# Patient Record
Sex: MALE | Race: WHITE
[De-identification: names, ages, dates, MRNs, and addresses within clinical notes are randomized per-mention and may not be internally consistent; named-entity substitution may affect disease eponyms.]

---
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
## 2023-01-12 ENCOUNTER — HOSPITAL ENCOUNTER (INPATIENT)
Dept: HOSPITAL 47 - 3MHU | Age: 23
LOS: 5 days | Discharge: HOME | DRG: 897 | End: 2023-01-17
Attending: PSYCHIATRY & NEUROLOGY | Admitting: PSYCHIATRY & NEUROLOGY
Payer: COMMERCIAL

## 2023-01-12 DIAGNOSIS — F19.14: Primary | ICD-10-CM

## 2023-01-12 DIAGNOSIS — Z79.899: ICD-10-CM

## 2023-01-12 DIAGNOSIS — Z63.4: ICD-10-CM

## 2023-01-12 DIAGNOSIS — F12.10: ICD-10-CM

## 2023-01-12 DIAGNOSIS — Z91.52: ICD-10-CM

## 2023-01-12 DIAGNOSIS — F41.9: ICD-10-CM

## 2023-01-12 DIAGNOSIS — Z71.41: ICD-10-CM

## 2023-01-12 DIAGNOSIS — Z65.3: ICD-10-CM

## 2023-01-12 DIAGNOSIS — F17.210: ICD-10-CM

## 2023-01-12 DIAGNOSIS — Z71.51: ICD-10-CM

## 2023-01-12 DIAGNOSIS — F10.10: ICD-10-CM

## 2023-01-12 DIAGNOSIS — Z71.6: ICD-10-CM

## 2023-01-12 DIAGNOSIS — F84.0: ICD-10-CM

## 2023-01-12 DIAGNOSIS — F90.9: ICD-10-CM

## 2023-01-12 DIAGNOSIS — Z56.0: ICD-10-CM

## 2023-01-12 PROCEDURE — 80061 LIPID PANEL: CPT

## 2023-01-12 PROCEDURE — 84443 ASSAY THYROID STIM HORMONE: CPT

## 2023-01-12 PROCEDURE — 85025 COMPLETE CBC W/AUTO DIFF WBC: CPT

## 2023-01-12 PROCEDURE — 80053 COMPREHEN METABOLIC PANEL: CPT

## 2023-01-12 SDOH — ECONOMIC STABILITY - INCOME SECURITY: UNEMPLOYMENT, UNSPECIFIED: Z56.0

## 2023-01-12 SDOH — SOCIAL STABILITY - SOCIAL INSECURITY: DISSAPEARANCE AND DEATH OF FAMILY MEMBER: Z63.4

## 2023-01-12 NOTE — P.MDCNMH
History of Present Illness


H&P Date: 01/12/23


Chief Complaint: Medical clearance





22-year-old man with no medical history, with psychiatric history of psychosis, 

presented with psychosis.  Psychiatry is following patient, consulted medicine 

for medical clearance.  Patient has no medical complaints.  Patient denies any 

history of hypertension, diabetes, hyperlipidemia, heart disease, kidney 

disease, liver disease, strokes, family history of heart disease.  Patient also 

denies fevers, chills, nausea, vomiting, chest pain, palpitations, syncope, 

presyncope, cough, dyspnea, abdominal pain, constipation, diarrhea, dysuria, 

skin, numbness/weakness of extremities.





, Evaluation, patient is afebrile, hemodynamically stable.  No labs to review.  

No images to review.





All Systems reviewed and pertinent positives and negatives noted in HPI, all 

other symptoms are negative





Gen: in no apparent distress, resting comfortably in bed


Eyes: PERRL, no scleral injection or icterus


HENT: normocephalic, atraumatic, good hearing acuity, moist mucous membranes


Neck: no tracheal deviation, full range of motion


Resp: good air exchange, breathing comfortably with no accessory muscle use, no 

tactile fremitus


CVS: good distal perfusion x 4, no pitting edema


GI: soft, NTTP, ND, no hepatosplenomegaly


: no suprapubic tenderness, no CVAT, thomas catheter not present


MSK: no clubbing, no cyanosis, no noted contractures of extremities


Skin: no noted rashes, petechiae; temperature of skin is appropriate


Neuro: moving all extremities without signs of weakness, CN II-XII intact


Psych: cooperative, euthymic mood, insight and judgment intact





Assessment/plan:





Nicotine abuse


-Recommended cessation


-Nicotine lozenges available upon request








Psychosis


-Care per primary team





Thank you for this consult.  A member El[facility] is available 24/7, should any 

questions or concerns arise please whichever perfect serve








Past Medical History


Past Medical History: No Reported History


Additional Past Medical History / Comment(s): Hospitalized for influenza-gets 

anxiety.  Depression- In counseling and on medication.  History of self injury 

behavior


History of Any Multi-Drug Resistant Organisms: None Reported


Past Surgical History: Orthopedic Surgery


Additional Past Surgical History / Comment(s): rt femur fracture with dimas 

placement and removal


Past Anesthesia/Blood Transfusion Reactions: No Reported Reaction


Past Psychological History: Anxiety, Bipolar, Depression


Additional Psychological History / Comment(s): hx cutting 3 months ago. Sees Dr Hickman once every couple of months, monitors medications, [facility] in [place]. 

Gets Anxiety in the hospital, likes to be home.


Smoking Status: Current every day smoker


Past Alcohol Use History: None Reported


Past Drug Use History: None Reported





- Past Family History


  ** Mother


Additional Family Medical History / Comment(s): multiple sclerosis





  ** Father


History Unknown: Yes





  ** Brother(s)


Family Medical History: No Reported History





Medications and Allergies


                                Home Medications











 Medication  Instructions  Recorded  Confirmed  Type


 


ARIPiprazole [Abilify] 20 mg PO HS 11/19/19 11/22/19 History


 


Escitalopram [Lexapro] 20 mg PO DAILY 11/19/19 11/22/19 History


 


Lisdexamfetamine Dimesylate 60 mg PO DAILY 11/19/19 11/22/19 History





[Vyvanse]    


 


Acetaminophen Tab [Tylenol Tab] 500 mg PO Q6H PRN #30 tablet 11/22/19  Rx


 


Ibuprofen [Motrin] 600 mg PO Q8HR PRN #30 tab 11/22/19  Rx


 


Omeprazole 40 mg PO DAILY #14 capsule. 11/22/19  Rx








                                    Allergies











Allergy/AdvReac Type Severity Reaction Status Date / Time


 


No Known Allergies Allergy   Verified 11/22/19 14:32














Physical Exam


Osteopathic Statement: *.  No significant issues noted on an osteopathic 

structural exam other than those noted in the History and Physical/Consult.


Vitals: 


                                   Vital Signs











  Temp Pulse Resp BP Pulse Ox


 


 01/12/23 11:29  97.6 F  108 H  16  127/75  97








                                Intake and Output











 01/11/23 01/12/23 01/12/23





 22:59 06:59 14:59


 


Other:   


 


  Weight   90.718 kg














Cranial Nerve Examination





- Cranial Nerves


Cranial Nerve II- Optic: Intact


Cranial Nerve III- Oculomotor: Intact


Cranial Nerve IV- Trochlear: Intact


Cranial Nerve V- Trigeminal: Intact


Cranial Nerve VI- Abducens: Intact


Cranial Nerve VII- Facial: Intact


Cranial Nerve VIII- Auditory: Intact


Cranial Nerve IX- Glossopharyngeal: Intact


Cranial Nerve X- Vagus: Intact


Cranial Nerve XI- Accessory: Intact


Cranial Nerve XII- Hypoglossal: Intact

## 2023-01-13 LAB
ALBUMIN SERPL-MCNC: 4.8 G/DL (ref 3.5–5)
ALP SERPL-CCNC: 51 U/L (ref 38–126)
ALT SERPL-CCNC: 16 U/L (ref 4–49)
ANION GAP SERPL CALC-SCNC: 6 MMOL/L
AST SERPL-CCNC: 23 U/L (ref 17–59)
BASOPHILS # BLD AUTO: 0.1 K/UL (ref 0–0.2)
BASOPHILS NFR BLD AUTO: 1 %
BUN SERPL-SCNC: 19 MG/DL (ref 9–20)
CALCIUM SPEC-MCNC: 10 MG/DL (ref 8.4–10.2)
CHLORIDE SERPL-SCNC: 105 MMOL/L (ref 98–107)
CHOLEST SERPL-MCNC: 132 MG/DL (ref 0–200)
CO2 SERPL-SCNC: 31 MMOL/L (ref 22–30)
EOSINOPHIL # BLD AUTO: 0.4 K/UL (ref 0–0.7)
EOSINOPHIL NFR BLD AUTO: 5 %
ERYTHROCYTE [DISTWIDTH] IN BLOOD BY AUTOMATED COUNT: 5.17 M/UL (ref 4.3–5.9)
ERYTHROCYTE [DISTWIDTH] IN BLOOD: 12.6 % (ref 11.5–15.5)
GLUCOSE SERPL-MCNC: 85 MG/DL (ref 74–99)
HCT VFR BLD AUTO: 46.7 % (ref 39–53)
HDLC SERPL-MCNC: 58.2 MG/DL (ref 40–60)
HGB BLD-MCNC: 16.1 GM/DL (ref 13–17.5)
LDLC SERPL CALC-MCNC: 59.6 MG/DL (ref 0–131)
LYMPHOCYTES # SPEC AUTO: 1.8 K/UL (ref 1–4.8)
LYMPHOCYTES NFR SPEC AUTO: 23 %
MCH RBC QN AUTO: 31.2 PG (ref 25–35)
MCHC RBC AUTO-ENTMCNC: 34.5 G/DL (ref 31–37)
MCV RBC AUTO: 90.4 FL (ref 80–100)
MONOCYTES # BLD AUTO: 0.5 K/UL (ref 0–1)
MONOCYTES NFR BLD AUTO: 6 %
NEUTROPHILS # BLD AUTO: 4.9 K/UL (ref 1.3–7.7)
NEUTROPHILS NFR BLD AUTO: 64 %
PLATELET # BLD AUTO: 237 K/UL (ref 150–450)
POTASSIUM SERPL-SCNC: 4.7 MMOL/L (ref 3.5–5.1)
PROT SERPL-MCNC: 7.8 G/DL (ref 6.3–8.2)
SODIUM SERPL-SCNC: 142 MMOL/L (ref 137–145)
TRIGL SERPL-MCNC: 71.2 MG/DL (ref 0–149)
VLDLC SERPL CALC-MCNC: 14.24 MG/DL (ref 5–40)
WBC # BLD AUTO: 7.7 K/UL (ref 3.8–10.6)

## 2023-01-13 RX ADMIN — ASPIRIN SCH: 325 TABLET ORAL at 09:53

## 2023-01-13 RX ADMIN — ESCITALOPRAM OXALATE SCH MG: 20 TABLET, FILM COATED ORAL at 09:52

## 2023-01-13 RX ADMIN — NICOTINE SCH PATCH: 21 PATCH, EXTENDED RELEASE TRANSDERMAL at 09:53

## 2023-01-13 NOTE — P.HP
Psychiatric H&P





- .


H&P Date: 01/13/23


History & Physical: 


                                    Allergies











Allergy/AdvReac Type Severity Reaction Status Date / Time


 


No Known Allergies Allergy   Verified 11/22/19 14:32








                                   Vital Signs











Temp  97.6 F   01/12/23 11:29


 


Pulse  108 H  01/12/23 11:29


 


Resp  16   01/12/23 11:29


 


BP  127/75   01/12/23 11:29


 


Pulse Ox  97   01/12/23 11:29


 


FiO2      








                                 Intake & Output











 01/12/23 01/13/23 01/13/23





 18:59 06:59 18:59


 


Weight 90.718 kg  








                             Laboratory Last Values











WBC  7.7 k/uL (3.8-10.6)   01/13/23  08:00    


 


RBC  5.17 m/uL (4.30-5.90)   01/13/23  08:00    


 


Hgb  16.1 gm/dL (13.0-17.5)   01/13/23  08:00    


 


Hct  46.7 % (39.0-53.0)   01/13/23  08:00    


 


MCV  90.4 fL (80.0-100.0)   01/13/23  08:00    


 


MCH  31.2 pg (25.0-35.0)   01/13/23  08:00    


 


MCHC  34.5 g/dL (31.0-37.0)   01/13/23  08:00    


 


RDW  12.6 % (11.5-15.5)   01/13/23  08:00    


 


Plt Count  237 k/uL (150-450)   01/13/23  08:00    


 


MPV  7.3   01/13/23  08:00    


 


Neutrophils %  64 %  01/13/23  08:00    


 


Lymphocytes %  23 %  01/13/23  08:00    


 


Monocytes %  6 %  01/13/23  08:00    


 


Eosinophils %  5 %  01/13/23  08:00    


 


Basophils %  1 %  01/13/23  08:00    


 


Neutrophils #  4.9 k/uL (1.3-7.7)   01/13/23  08:00    


 


Lymphocytes #  1.8 k/uL (1.0-4.8)   01/13/23  08:00    


 


Monocytes #  0.5 k/uL (0-1.0)   01/13/23  08:00    


 


Eosinophils #  0.4 k/uL (0-0.7)   01/13/23  08:00    


 


Basophils #  0.1 k/uL (0-0.2)   01/13/23  08:00    


 


Sodium  142 mmol/L (137-145)   01/13/23  07:49    


 


Potassium  4.7 mmol/L (3.5-5.1)   01/13/23  07:49    


 


Chloride  105 mmol/L ()   01/13/23  07:49    


 


Carbon Dioxide  31 mmol/L (22-30)  H  01/13/23  07:49    


 


Anion Gap  6 mmol/L  01/13/23  07:49    


 


BUN  19 mg/dL (9-20)   01/13/23  07:49    


 


Creatinine  0.80 mg/dL (0.66-1.25)   01/13/23  07:49    


 


Est GFR (CKD-EPI)AfAm  >90  (>60 ml/min/1.73 sqM)   01/13/23  07:49    


 


Est GFR (CKD-EPI)NonAf  >90  (>60 ml/min/1.73 sqM)   01/13/23  07:49    


 


Glucose  85 mg/dL (74-99)   01/13/23  07:49    


 


Calcium  10.0 mg/dL (8.4-10.2)   01/13/23  07:49    


 


Total Bilirubin  0.8 mg/dL (0.2-1.3)   01/13/23  07:49    


 


AST  23 U/L (17-59)   01/13/23  07:49    


 


ALT  16 U/L (4-49)   01/13/23  07:49    


 


Alkaline Phosphatase  51 U/L ()   01/13/23  07:49    


 


Total Protein  7.8 g/dL (6.3-8.2)   01/13/23  07:49    


 


Albumin  4.8 g/dL (3.5-5.0)   01/13/23  07:49    


 


TSH  0.474 mIU/L (0.465-4.680)   01/13/23  07:49    











01/13/23 13:27


IDENTIFYING DATA: Patient is a 22-year-old single, unemployed, on disability, 

 male with significant history of autism spectrum disorder who 

presented to our hospital transferred from [facility] for psychiatric 

evaluation.





HPI: Patient presented to the hospital on 01/12/2023, transferred from [facility] for psychiatric evaluation.  As per APS report, the patient's father recently

passed away from cancer and has been increasingly depressed.  He has also been 

abusing his Vyvanse.  He was subsequently admitted to the psychiatric unit.


Upon evaluation on the psychiatric unit, the patient reports that he has been 

using approximately 120-240 mg of Vyvanse daily.  He reports that he was using 

this for the enjoyment in order to "feel better."  He states that over the past 

few days, he began to experience significant paranoia.  He reports that he was 

feeling like he was detached from the world and that people around him were 

often talking about him or working against him.  Furthermore, the patient 

reports that he has not been able to sleep over the past few days.  He reports 

that he has had little to no appetite.  He however denies any auditory or visual

hallucinations.  He does acknowledge racing thoughts and excessive worry.  The 

patient does report that his father passed away in September.  He reports that 

he has been unable to cope.  He reports that he was drinking all the time in 

order to cope.  He does endorse significant symptoms of depression including 

anhedonia, and crying episodes.  The patient vehemently denies any suicidal or 

homicidal ideation, intention, and/or plan.  


The patient is currently on a regimen of Abilify and Lexapro and Vyvanse.  He 

reports that this medication regimen has been helpful however he has been 

abusing the Vyvanse.





PAST PSYCHIATRIC HISTORY: Patient states that he has been to see diagnosed with 

autism spectrum disorder, depression, and anxiety.  He is currently on a home 

regimen of Vyvanse, Lexapro, and Abilify.  Patient denies any previous 

psychiatric hospitalizations.  The patient is currently open with [facility] psychiatric care with Dr. Stewart. Patient denies any history of suicide 

attempts in the past.





PMH:


Past Medical History: No Reported History


Additional Past Medical History / Comment(s): Hospitalized for influenza-gets 

anxiety.  Depression- In counseling and on medication.  History of self injury 

behavior


History of Any Multi-Drug Resistant Organisms: None Reported


Past Surgical History: Orthopedic Surgery


Additional Past Surgical History / Comment(s): rt femur fracture with dimas 

placement and removal


Past Anesthesia/Blood Transfusion Reactions: No Reported Reaction


Past Psychological History: Anxiety, Bipolar, Depression


Additional Psychological History / Comment(s): hx cutting 3 months ago. Sees Dr Hickman once every couple of months, monitors medications, [facility] in [place]. 

Gets Anxiety in the hospital, likes to be home.


Smoking Status: Current every day smoker


Past Alcohol Use History: None Reported


Past Drug Use History: None Reported





ALLERGIES: NO KNOWN DRUG ALLERGIES





CHEMICAL DEPENDENCY HISTORY: The patient reports smoking one pack per day of 

tobacco.  He reports that he was drinking heavily at first (approximately 15 

beers a day) however stopped drinking since June after being charged for 

depressive violence.  He also reports daily marijuana use.  He denies any 

illicit drug use and admits that he was abusing his Vyvanse.





FAMILY PSYCHIATRIC/SUBSTANCE USE HISTORY:  The patient denies any family history

of psychiatric illness or substance abuse history.





SOCIAL HISTORY: Patient was born in [place] and raised in [place], [place].  He

receives Social Security due to his autism spectrum disorder.  He reports he was

diagnosed when he was 6 years old.  Currently lives with his mother and brother.

 He states that he is a Jehovah's witness.  He reports that he is on probation for 

domestic violence.





MENTAL STATUS EXAM: 


General Appearance: Patient appears to be stated age is alert, directable, and 

attempts to cooperate. Patient appears to have slightly disheveled hygiene and 

grooming.


Behavior: Patient is seated without any agitated behavior.  Eye contact is 

appropriate.


Speech: Patient's speech is fluent and nonpressured.  Monotone.


Mood/Affect: Patient reports their mood is anxious but better, affect is 

congruent and flat.


Suicidality/Homicidality:  Patient denies having any homicidal ideation intent 

or plan. Denies any suicidal ideations intent or plan  


Perceptions: Patient denies any visual hallucinations and denies any auditory 

hallucinations


Though content/process: Patient endorses mild feelings of paranoia.


Memory and concentration: AOX3, grossly intact for the purposes of this session.

Can spell "WORLD" backwards


Judgment and insight: Mildly improving





STRENGTHS/WEAKNESSES: Strength is that the patient has good support.  Weakness 

is that the patient engages in substance abuse.





INTELLECT: Below average to average





IMPRESSIONS: 


Substance-induced bipolar disorder


Bereavement


Alcohol use disorder, in partial remission


Cannabis use disorder


Tobacco use disorder





PLAN: 


-Patient is admitted under voluntary status to [facility] for stabilization of 

psychiatric symptoms and safety. Patient signed adult voluntary form and 

medication consent and is placed in patient's chart. 


-Medications : 


We will discontinue Vyvanse.  We will continue the patient's home medications of

Lexapro 20 mg daily and Abilify 20 mg daily. Patient is currently on 

antipsychotic regimen with abilify. Consider CASTELLANO transition. Will provide 

patient tincture of time off vyvanse.


-Ativan and Haldol PRN for agitation/aggression


-Patient was counselled on substance abuse and desired to cut back on use


-Patient was informed of the risks, benefits and side effects of the medication 

and patient verbally consented to taking the medications. Patient signed med 

consent form and was placed in chart.


-Internal Medicine consult to perform medical evaluation and physical.


-NRT - nicotine patch


-SW on board for discharge planning. Encourage patient to participate in groups 

to work on coping skills. 


01/13/23 13:27

## 2023-01-14 RX ADMIN — ASPIRIN SCH: 325 TABLET ORAL at 09:34

## 2023-01-14 RX ADMIN — NICOTINE SCH PATCH: 21 PATCH, EXTENDED RELEASE TRANSDERMAL at 09:33

## 2023-01-14 RX ADMIN — AMITRIPTYLINE HYDROCHLORIDE SCH MG: 50 TABLET, FILM COATED ORAL at 16:26

## 2023-01-14 RX ADMIN — ESCITALOPRAM OXALATE SCH MG: 20 TABLET, FILM COATED ORAL at 09:34

## 2023-01-14 NOTE — P.PN
Subjective


Progress Note Date: 01/14/23


Principal diagnosis: 





Jan 14, 2023,


He was seen today face to face in his room He was alert and engaged with him by 

the bedside, No behavioral mannerism no speech problem. He spoke in a calm 

manner re: loss of his family as the trigger for his psychiatric compensation. 

When asked about the overdosse of Vyanese mixed with Valium he spoke i na matter

of act re: His selfmediation attempt to manage better his worsening mood. He did

not articulate the sense of loos . He talked about his early school but did not 

feel stigmatize of his diagnosis of ASD.  ADHD as part of the ASD sydnrome was 

th emost okikely for him to be started on Vyanase. Anxiety fsymptom was apparent

; hwoever, his impulsivity fas his ASHD might explain whey he overdosed on Rx. 

He was unaware of the link. He was reported to be participating in social groups

on the unit: no acting out behavior No vocied suicidal behavior.





RE: ASD with comorbid or integral to ASD (Autism sepctrum disorder) ADHD : 

impulsivity and atypical mood disorder related to prolonged bereavement . 


Fmaily dynsmics to be explored. Comorbid medical condition to be reviewed, 





[facility]< He was coherent and had good eye contact.wth no motor or dysphasis . He was

pleasant coperative and reported his mood was low around th etime of his ov

erdose. but he reassureed me his suicidal ideaiton has larrgely resolved. He did

not have any preoccupation with viiolence theme no homicidal or suicidal 

ideation. He did not endorse any perceptual disturbance. Thought process. no 

derailment or tangentiality. Cog. Average intelligence. socail cognition to be 

further reassessed, Insight and judgment has advanced to above average level 





Management: 


1. He c/o of Gi vomitiing after amitryptyline 100 mg was d/c.  Rule out IBS or 

Alternative Rx for his OCD of his ASD syndrome


2. he might benefit from non-atimulatnt Rx; guanafacine for his ADHD .  


3. Ability dosage can be firther titrated.  


4. Self Rx may predict substnace mis use syndrome. He may do through vyvanese 

withdrawal syndrome to be monitored closel


5. Adhere to discharge planning


. famiy support to be reinforced.





Objective





- Vital Signs


Vital signs: 


                                   Vital Signs











Temp  97.9 F   01/14/23 06:41


 


Pulse  68   01/14/23 06:41


 


Resp  18   01/14/23 06:41


 


BP  145/84   01/14/23 06:41


 


Pulse Ox  99   01/14/23 06:41


 


FiO2      














- Labs


CBC & Chem 7: 


                                 01/13/23 08:00





                                 01/13/23 07:49

## 2023-01-15 RX ADMIN — ASPIRIN SCH: 325 TABLET ORAL at 09:29

## 2023-01-15 RX ADMIN — ESCITALOPRAM OXALATE SCH MG: 20 TABLET, FILM COATED ORAL at 09:29

## 2023-01-15 RX ADMIN — NICOTINE SCH PATCH: 21 PATCH, EXTENDED RELEASE TRANSDERMAL at 09:28

## 2023-01-15 RX ADMIN — AMITRIPTYLINE HYDROCHLORIDE SCH MG: 50 TABLET, FILM COATED ORAL at 09:29

## 2023-01-15 NOTE — P.PN
Subjective


Progress Note Date: 01/15/23


Principal diagnosis: 





porgress note text


He was seen today in his room . He did not want to talk further about his ADHD 

symptoms no craving for Vyvances. hE contacted his parents in good terms. He 

lacked insight as to the indications for various Rx and di d not want to talk 

about how his ASD social cognition symptoms may be improved wiht non-stimulant 

Rx; Guanfacine , Clonidien ER.  He talked about his plan to be disschared early 

next week. Diagnosis: ASD improved. Comorbid depressive disorder good response 

after he was titrated from his Rx at admisison. Plan: Connect with his family . 

Early discrhage. Follow up by his psychaitric team.





Objective





- Vital Signs


Vital signs: 


                                   Vital Signs











Temp  97.9 F   01/14/23 06:41


 


Pulse  68   01/14/23 06:41


 


Resp  18   01/14/23 06:41


 


BP  145/84   01/14/23 06:41


 


Pulse Ox  99   01/14/23 06:41


 


FiO2      














- Labs


CBC & Chem 7: 


                                 01/13/23 08:00





                                 01/13/23 07:49

## 2023-01-16 RX ADMIN — ASPIRIN SCH: 325 TABLET ORAL at 10:30

## 2023-01-16 RX ADMIN — NICOTINE SCH PATCH: 21 PATCH, EXTENDED RELEASE TRANSDERMAL at 08:34

## 2023-01-16 RX ADMIN — ESCITALOPRAM OXALATE SCH MG: 20 TABLET, FILM COATED ORAL at 08:34

## 2023-01-16 RX ADMIN — AMITRIPTYLINE HYDROCHLORIDE SCH MG: 50 TABLET, FILM COATED ORAL at 08:34

## 2023-01-16 NOTE — P.PN
Progress Note - Text


Progress Note Date: 01/16/23





Interval History:


Patient was seen wandering the hallways and was directable and agreeable to 

speak with writer in the office.  Currently, the patient is reporting that he is

feeling significantly better. He reports that he is not feeling any paranoia or 

experiencing any auditory or visual hallucinations. He reports an improved 

mental clarity. He states his mood has also improved. He reports no suicidal or 

homicidal ideation, intention, and/or plan. He reports no auditory or visual 

hallucinations. He reports no issues regarding his sleep or his appetite. He has

been adherent with meds and denies any side effects. 





Mental Status Exam:


General Appearance: Patient appears to be stated age is alert, directable, and 

cooperative. 


Behavior: Patient is calmly seated without any agitated behavior.


Speech: Patient's speech is fluent and nonpressured. 


Mood/Affect: Mood is improving mildly, affect is congruent and constricted. 


Suicidality/Homicidality:  Patient denies having any suicidal or homicidal 

ideation intent or plan.  


Perceptions: Patient denies any visual hallucinations and denies any auditory 

hallucinations  


Though content/process: There is no evidence of any delusional thought content 

and thought process is linear and goal-directed. 


Memory and concentration: AOX3, grossly intact for the purposes of this session


Judgment and insight: Improving mildly





                                   Vital Signs











Temp  98.1 F   01/16/23 06:31


 


Pulse  73   01/16/23 06:31


 


Resp  14   01/16/23 06:31


 


BP  111/68   01/16/23 06:31


 


Pulse Ox  98   01/16/23 06:31


 


FiO2      














Assessment


Substance-induced bipolar disorder


Autism spectrum disorder


Bereavement


Alcohol use disorder, in partial remission


Cannabis use disorder


Tobacco use disorder





Plan:


-Patient continues to meet criteria for inpatient psychiatric admission for 

symptom stabilization and safety. Patient has signed adult voluntary form and 

medication consent and was placed in patient's chart.


-Medications: 


We will discontinue Lexapro as the patient is currently on Elavil 100 mg by 

mouth daily to decrease risk of serotonin syndrome


Continue Abilify 20 mg by mouth daily for psychosis


-When necessary Ativan and Haldol for agitation/aggression.


-NRT - nicotine patch


-SW on board for discharge planning.  Encouraged the patient to participate in 

milieu.

## 2023-01-17 VITALS
RESPIRATION RATE: 17 BRPM | HEART RATE: 74 BPM | SYSTOLIC BLOOD PRESSURE: 123 MMHG | DIASTOLIC BLOOD PRESSURE: 68 MMHG | TEMPERATURE: 98.3 F

## 2023-01-17 RX ADMIN — ASPIRIN SCH: 325 TABLET ORAL at 09:05

## 2023-01-17 RX ADMIN — NICOTINE SCH PATCH: 21 PATCH, EXTENDED RELEASE TRANSDERMAL at 09:04

## 2023-01-17 RX ADMIN — AMITRIPTYLINE HYDROCHLORIDE SCH MG: 50 TABLET, FILM COATED ORAL at 09:04

## 2023-01-17 NOTE — P.DS
Providers


Date of admission: 


01/12/23 11:18





Expected date of discharge: 01/17/23


Attending physician: 


Herb Arreola MD





Consults: 





                                        





01/12/23 10:04


Consult Physician Routine 


   Consulting Provider: [facility]


   Consult Reason/Comments: history and physical


   Do you want consulting provider notified?: Yes











Primary care physician: 


Anastasia Stone








- Discharge Diagnosis(es)


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


Current Visit: Yes   Status: Acute   Priority: High   





(2) Bereavement


Current Visit: Yes   Status: Acute   Priority: High   





(3) Tobacco use disorder


Current Visit: Yes   Status: Chronic   Priority: Medium   





(4) Cannabis use disorder


Current Visit: Yes   Status: Chronic   Priority: Medium   


Hospital Course: 





Admission HPI:


Patient is a 22-year-old single, unemployed, on disability,  male with 

significant history of autism spectrum disorder who presented to our hospital 

transferred from [facility] for psychiatric evaluation.





Patient presented to the hospital on 01/12/2023, transferred from [facility] 

for psychiatric evaluation.  As per APS report, the patient's father recently 

passed away from cancer and has been increasingly depressed.  He has also been 

abusing his Vyvanse.  He was subsequently admitted to the psychiatric unit.


Upon evaluation on the psychiatric unit, the patient reports that he has been 

using approximately 120-240 mg of Vyvanse daily.  He reports that he was using 

this for the enjoyment in order to "feel better."  He states that over the past 

few days, he began to experience significant paranoia.  He reports that he was 

feeling like he was detached from the world and that people around him were 

often talking about him or working against him.  Furthermore, the patient 

reports that he has not been able to sleep over the past few days.  He reports 

that he has had little to no appetite.  He however denies any auditory or visual

hallucinations.  He does acknowledge racing thoughts and excessive worry.  The 

patient does report that his father passed away in September.  He reports that 

he has been unable to cope.  He reports that he was drinking all the time in 

order to cope.  He does endorse significant symptoms of depression including 

anhedonia, and crying episodes.  The patient vehemently denies any suicidal or 

homicidal ideation, intention, and/or plan.  


The patient is currently on a regimen of Abilify and Lexapro and Vyvanse.  He 

reports that this medication regimen has been helpful however he has been 

abusing the Vyvanse.





Patient states that he has been to see diagnosed with autism spectrum disorder, 

depression, and anxiety.  He is currently on a home regimen of Vyvanse, Lexapro,

and Abilify.  Patient denies any previous psychiatric hospitalizations.  The 

patient is currently open with [facility] psychiatric care with Dr. Stewart. Patient denies any history of suicide attempts in the past.





Hospital course:


Upon admission to the unit patient was initially presenting as overtly paranoid 

and distressed. Patient was however directable and agreeable to commence 

treatment. Patient got along well with other patients on the unit and followed 

unit protocol.  Patient was compliant with the medications and denied any side 

effects throughout hospital course.  Patient was started on his home medications

of Abilify and Lexapro in his Vyvanse was discontinued after endorsing an 

abusive his Vyvanse.  Patient spoke of his stressors and engaged in therapy both

group and individual.  Patient was also seen by medical team for history and 

physical exam.  Was later determined that the patient was also receiving Elavil 

as a home medication for depression/anxiety.  Therefore, the patient's Lexapro 

was discontinued due to concerns for over activation and to decrease risk of 

serotonin syndrome.  On his regimen of Abilify and Elavil, the patient displayed

significant improvement in regards to his target symptoms of psychosis and mood.

 On the day of discharge, the patient is not reporting any suicidal or homicidal

ideation, intention, and/or plan.  He denies any access to firearms or other 

weapons.  He expresses a strong desire to live for himself and for his family.  

He reports no auditory or visual hallucinations.  He is denying any overt 

paranoia or other delusions.  The patient does have a significant history of 

substance abuse however was counseled at great length on abstaining from all 

substances including the abuse of his medications, tobacco, alcohol, and 

cannabis.  He was encouraged to follow-up with his outpatient appointments for 

mental health and for primary care.  As the patient not longer met criteria for 

continued inpatient psychiatric hospitalization, he was subsequently discharged.







Mental status exam:


General Appearance: Patient appears to be stated age is alert, pleasant, and 

cooperative. Patient is in no acute distress and has fair hygiene and grooming 


Behavior: Patient is calmly seated without any agitated behavior.  Eye contact 

is appropriate


Speech: Patient's speech is fluent and nonpressured. 


Mood/Affect: Patient reports their mood is "much better", affect is congruent 

and euthymic to bright. 


Suicidality/Homicidality:  Patient denies having any suicidal or homicidal 

ideation intent or plan.  


Perceptions: Patient denies any auditory or visual hallucinations.  


Though content/process: There is no evidence of any delusional thought content 

and thought process is linear and goal-directed.  Future oriented


Memory and concentration: AOX3, grossly intact for the purposes of this session.

Can spell "WORLD" backwards correctly.


Judgment and insight: Improved with guarded prognosis





Impression:


Substance-induced bipolar disorder


Bereavement


Alcohol use disorder, in partial remission


Cannabis use disorder


Tobacco use disorder





Plan:


-Continue with discharge today as patient has improved and stabilized 

psychiatrically and is not currently an imminent threat to himself and/or 

others. Patient will remain at chronically elevated risk for harm to self and/or

others due to his impulsivity and substance abuse.


-Continue medications: 


Abilify 20 mg by mouth daily for psychosis/mood stability


Elavil 100 mg daily for depression/anxiety


Habitrol patches for nicotine cessation


-Patient was counseled on the need for medication compliance and appropriate 

follow-up at mental health and also primary care for medical issues.  Patient 

verbalized understanding and agreed.


-Social work to arrange for and conduct family meeting to ensure safety upon 

discharge and answer any questions/concerns. Social work also to arrange for 

patients follow up appointments with [facility] for psychiatric care along with follow 

up with primary care provider.


-Patient counseled on abstaining from recreational drugs and marijuana and 

alcohol. Was informed/educated on the adverse effects on their physical and 

mental health. Patient verbally agreed and understood. Patient was offered 

substance abuse treatment however declined at this time.


-Patient was instructed to return to the hospital or seek immediate medical care

if their psychiatric or medical symptoms do worsen or reoccur.


-Psychoeducation and supportive therapy provided to patient.  Risks and benefits

of pharmacological treatment versus the risks and benefits of nontreatment 

weight and discussed.  Informed consent discussion held.  Common side effects of

psychotropics discussed such as, but not limited to headache, GI disturbance, 

sexual dysfunction, movement disorders, sedation, and orthostatic hypotension.  

Life threatening and blackbox warnings of prescribed medications also discussed.

 Potential risks of operating a vehicle or heavy machinery discussed with 

patient at length.  Advised on importance of compliance and a reliable and 

responsible manner. Patient advised to review FDA consumer labeling of all 

medications prior to taking.  Patient verbalized understanding of potential 

risks, and agrees with current treatment plan.  Patient advised to medically 

contact physician/emergency personnel if any acute changes in condition occur.








                                   Vital Signs











Temp  98.3 F   01/17/23 06:10


 


Pulse  74   01/17/23 06:10


 


Resp  17   01/17/23 06:10


 


BP  123/68   01/17/23 06:10


 


Pulse Ox  98   01/17/23 06:10


 


FiO2      











                               Laboratory Results











WBC  7.7 k/uL (3.8-10.6)   01/13/23  08:00    


 


RBC  5.17 m/uL (4.30-5.90)   01/13/23  08:00    


 


Hgb  16.1 gm/dL (13.0-17.5)   01/13/23  08:00    


 


Hct  46.7 % (39.0-53.0)   01/13/23  08:00    


 


MCV  90.4 fL (80.0-100.0)   01/13/23  08:00    


 


MCH  31.2 pg (25.0-35.0)   01/13/23  08:00    


 


MCHC  34.5 g/dL (31.0-37.0)   01/13/23  08:00    


 


RDW  12.6 % (11.5-15.5)   01/13/23  08:00    


 


Plt Count  237 k/uL (150-450)   01/13/23  08:00    


 


MPV  7.3   01/13/23  08:00    


 


Neutrophils %  64 %  01/13/23  08:00    


 


Lymphocytes %  23 %  01/13/23  08:00    


 


Monocytes %  6 %  01/13/23  08:00    


 


Eosinophils %  5 %  01/13/23  08:00    


 


Basophils %  1 %  01/13/23  08:00    


 


Neutrophils #  4.9 k/uL (1.3-7.7)   01/13/23  08:00    


 


Lymphocytes #  1.8 k/uL (1.0-4.8)   01/13/23  08:00    


 


Monocytes #  0.5 k/uL (0-1.0)   01/13/23  08:00    


 


Eosinophils #  0.4 k/uL (0-0.7)   01/13/23  08:00    


 


Basophils #  0.1 k/uL (0-0.2)   01/13/23  08:00    


 


Sodium  142 mmol/L (137-145)   01/13/23  07:49    


 


Potassium  4.7 mmol/L (3.5-5.1)   01/13/23  07:49    


 


Chloride  105 mmol/L ()   01/13/23  07:49    


 


Carbon Dioxide  31 mmol/L (22-30)  H  01/13/23  07:49    


 


Anion Gap  6 mmol/L  01/13/23  07:49    


 


BUN  19 mg/dL (9-20)   01/13/23  07:49    


 


Creatinine  0.80 mg/dL (0.66-1.25)   01/13/23  07:49    


 


Est GFR (CKD-EPI)AfAm  >90  (>60 ml/min/1.73 sqM)   01/13/23  07:49    


 


Est GFR (CKD-EPI)NonAf  >90  (>60 ml/min/1.73 sqM)   01/13/23  07:49    


 


Glucose  85 mg/dL (74-99)   01/13/23  07:49    


 


Calcium  10.0 mg/dL (8.4-10.2)   01/13/23  07:49    


 


Total Bilirubin  0.8 mg/dL (0.2-1.3)   01/13/23  07:49    


 


AST  23 U/L (17-59)   01/13/23  07:49    


 


ALT  16 U/L (4-49)   01/13/23  07:49    


 


Alkaline Phosphatase  51 U/L ()   01/13/23  07:49    


 


Total Protein  7.8 g/dL (6.3-8.2)   01/13/23  07:49    


 


Albumin  4.8 g/dL (3.5-5.0)   01/13/23  07:49    


 


Triglycerides  71.20 mg/dL (0..00)   01/13/23  07:49    


 


Cholesterol  132.00 mg/dL (0..00)   01/13/23  07:49    


 


LDL Cholesterol, Calc  59.6 mg/dL (0.0-131.0)   01/13/23  07:49    


 


VLDL Cholesterol, Calc  14.24 mg/dL (5.00-40.00)   01/13/23  07:49    


 


HDL Cholesterol  58.20 mg/dL (40.00-60.00)   01/13/23  07:49    


 


Cholesterol/HDL Ratio  2.27 Ratio  01/13/23  07:49    


 


TSH  0.474 mIU/L (0.465-4.680)   01/13/23  07:49    











                                    Allergies











Allergy/AdvReac Type Severity Reaction Status Date / Time


 


No Known Allergies Allergy   Verified 11/22/19 14:32











Patient Condition at Discharge: Stable





Plan - Discharge Summary


Discharge Rx Participant: No


New Discharge Prescriptions: 


New


   ARIPiprazole [Abilify] 20 mg PO DAILY 30 Days #30 tab


   Nicotine 21Mg/24Hr Patch [Habitrol] 1 patch TRANSDERM DAILY 15 Days #15 patch


   Amitriptyline HCl [Elavil] 100 mg PO DAILY 30 Days #30 tab





Continue


   Omeprazole 40 mg PO DAILY #14 capsule.





Discontinued


   Lisdexamfetamine Dimesylate [Vyvanse] 60 mg PO DAILY


   Escitalopram [Lexapro] 20 mg PO DAILY


   ARIPiprazole [Abilify] 20 mg PO HS


   Ibuprofen [Motrin] 600 mg PO Q8HR PRN #30 tab


     PRN Reason: Pain


   Acetaminophen Tab [Tylenol Tab] 500 mg PO Q6H PRN #30 tablet


     PRN Reason: Pain


   Amitriptyline HCl [Elavil] 100 mg PO DAILY


Discharge Medication List





Omeprazole 40 mg PO DAILY #14 capsule. 11/22/19 [Rx]


ARIPiprazole [Abilify] 20 mg PO DAILY 30 Days #30 tab 01/17/23 [Rx]


Amitriptyline HCl [Elavil] 100 mg PO DAILY 30 Days #30 tab 01/17/23 [Rx]


Nicotine 21Mg/24Hr Patch [Habitrol] 1 patch TRANSDERM DAILY 15 Days #15 patch 

01/17/23 [Rx]








Follow up Appointment(s)/Referral(s): 


[facility]] - 01/19/23 11:00 am


(1-19-23 @ 11 am with Adi/


3-1-23 @ 10:30 am with Dr. Erickson)


Anastasia Stone, NPC [Primary Care Provider] - 1 Week


Patient Instructions/Handouts:  How to Stop Smoking (DC), Mood Disorders (DC)


Activity/Diet/Wound Care/Special Instructions: 


Avoid the use of street drugs and alcohol.  Take all prescriptions as 

prescribed.  When you are in need of refills on your medications, please contact

your medical provider and/or outpatient psychiatrist to have this done.  Please 

go to scheduled outpatient appointment for aftercare treatment.  If symptoms 

return or become worse, call the crisis line at [phone] and/or go to the 

nearest emergency room for evaluation


Discharge Disposition: HOME SELF-CARE